# Patient Record
Sex: FEMALE | Race: WHITE | NOT HISPANIC OR LATINO | Employment: FULL TIME | URBAN - NONMETROPOLITAN AREA
[De-identification: names, ages, dates, MRNs, and addresses within clinical notes are randomized per-mention and may not be internally consistent; named-entity substitution may affect disease eponyms.]

---

## 2020-01-19 NOTE — PROGRESS NOTES
Zoltan Metz MD     Chief Complaint   Patient presents with   • Sore Throat     tonsilitis 3 times last 5 months        HPI   Bhupinder Phan is a  19 y.o.  female who complains of frequent tonsillitis. The symptoms are localized to the throat. The patient has had moderate symptoms. The symptoms have been recurrent in nature, occurring 3-4 times for the last 3 years There have been no identified factors that aggravate the symptoms. The patient has been treated with antibiotics in the past with improved symptoms but a quick return after completion of the medication. She has had problems with antibiotics recently    Review of Systems   Reviewed per patient intake note.     Past History:  Past Medical History:   Diagnosis Date   • Allergic rhinitis    • Sinusitis    • Tonsillitis      Past Surgical History:   Procedure Laterality Date   • WISDOM TOOTH EXTRACTION       Family History   Problem Relation Age of Onset   • Hypertension Mother    • Cancer Maternal Grandmother         breast   • Cancer Paternal Grandfather      Social History     Tobacco Use   • Smoking status: Never Smoker   • Smokeless tobacco: Never Used   Substance Use Topics   • Alcohol use: Not Currently     Frequency: Never   • Drug use: Never     Outpatient Medications Marked as Taking for the 1/20/20 encounter (Office Visit) with Zoltan Metz MD   Medication Sig Dispense Refill   • ALTAVERA 0.15-30 MG-MCG per tablet Take  by mouth Daily.     • methylphenidate (CONCERTA) 27 MG CR tablet        Allergies:  Nuts        Vital Signs:  Temp:  [98.3 °F (36.8 °C)] 98.3 °F (36.8 °C)    Physical Exam   CONSTITUTIONAL: well nourished, well-developed, alert, oriented, in no acute distress   COMMUNICATION AND VOICE: able to communicate normally for age, normal voice/cry quality  HEAD: normocephalic, no lesions, atraumatic, no tenderness, no masses   FACE: appearance normal, no lesions, no tenderness, no deformities, facial motion  symmetric  SALIVARY GLANDS: parotid glands with no tenderness, no swelling, no masses, submandibular glands with normal size, nontender  EYES: ocular motility normal, eyelids normal, orbits normal, no proptosis, conjunctiva normal , pupils equal, round   EARS:  Hearing: response to conversational voice normal bilaterally   External Ears: auricles without lesions  EXTERNAL EAR CANALS: normal ear canals without stenosis or significant cerumen  TYMPANIC MEMBRANES: tympanic membrane appearance normal, no lesions, no perforation, normal mobility, no fluid  NOSE:  External Nose: structure normal, no tenderness on palpation, no nasal discharge, no lesions, no evidence of trauma, nostrils patent   Intranasal Exam: nasal mucosa normal, vestibule within normal limits, inferior turbinate normal, nasal septum midline   Nasopharynx: mirror exam deferred  ORAL:  Lips: upper and lower lips without lesion   Teeth: dentition within normal limits for age   Gums: gingivae healthy   Oral Mucosa: oral mucosa normal, no mucosal lesions   Floor of Mouth: Warthin’s duct patent, mucosa normal  Tongue: lingual mucosa normal without lesions, normal tongue mobility   Palate: soft and hard palates with normal mucosa and structure  Oropharynx: oropharynx mild erythema present, tonsils 2+ size  HYPOPHARYNX: mirror exam deferred  LARYNX: mirror exam deferred   NECK: neck appearance normal, no masses or tenderness  THYROID: no overt thyromegaly, no tenderness, nodules or mass present on palpation, position midline   LYMPHATIC: no lymphadenopathy  CHEST/RESPIRATORY: respiratory effort normal, normal chest excursion   CARDIOVASCULAR: extremities without cyanosis or edema   NEUROLOGIC/PSYCHIATRIC: oriented appropriately, mood normal, affect appropriate for age, CN II-XII intact grossly         Assessment:   1. Tonsillitis and adenoiditis, chronic            Plan:  Medical and surgical options were discussed including observation versus surgical  management. Risks, benefits and alternatives were discussed and questions were answered.  After considering the options, it was decided that tonsillectomy and adenoidectomy was the best option.     INFORMED CONSENT DISCUSSION:  TONSILLECTOMY AND ADENOIDECTOMY: A tonsillectomy and adenoidectomy were recommended. The risks and benefits were explained including but not limited to early and late bleeding, infection, risks of the general anesthesia, dysphagia and poor PO intake, and voice change/VPI.  Alternatives were discussed. Understanding of the risks was demonstrated. Questions were asked appropriately answered.      PREOPERATIVE WORKUP:   per anesthesia         FOLLOW UP:  follow up postoperatively        Zoltan Metz MD  01/20/20  12:34 PM

## 2020-01-20 ENCOUNTER — OFFICE VISIT (OUTPATIENT)
Dept: OTOLARYNGOLOGY | Facility: CLINIC | Age: 20
End: 2020-01-20

## 2020-01-20 VITALS — BODY MASS INDEX: 24.83 KG/M2 | HEIGHT: 68 IN | WEIGHT: 163.8 LBS | TEMPERATURE: 98.3 F

## 2020-01-20 DIAGNOSIS — J35.03 TONSILLITIS AND ADENOIDITIS, CHRONIC: Primary | ICD-10-CM

## 2020-01-20 PROCEDURE — 99203 OFFICE O/P NEW LOW 30 MIN: CPT | Performed by: OTOLARYNGOLOGY

## 2020-01-20 RX ORDER — LEVONORGESTREL AND ETHINYL ESTRADIOL 0.15-0.03
KIT ORAL DAILY
COMMUNITY
Start: 2019-12-27

## 2020-01-20 RX ORDER — METHYLPHENIDATE HYDROCHLORIDE 27 MG/1
27 TABLET ORAL EVERY MORNING
COMMUNITY
Start: 2020-01-15

## 2020-01-20 NOTE — PROGRESS NOTES
Dilcia Boone MA   Patient Intake Note    Review of Systems  Review of Systems   Constitutional: Negative.    HENT: Positive for sore throat.    Eyes: Negative.    Respiratory: Negative.    Cardiovascular: Negative.    Gastrointestinal: Negative.    Musculoskeletal: Negative.    Skin: Negative.    Neurological: Negative.    Psychiatric/Behavioral: Positive for sleep disturbance.       Tobacco Use: Screening and Cessation Intervention  Social History    Tobacco Use      Smoking status: Not on file        Dilcia Boone MA  1/20/2020  12:11 PM

## 2020-01-22 PROBLEM — J35.03 TONSILLITIS AND ADENOIDITIS, CHRONIC: Status: ACTIVE | Noted: 2020-01-22

## 2020-01-24 ENCOUNTER — APPOINTMENT (OUTPATIENT)
Dept: PREADMISSION TESTING | Facility: HOSPITAL | Age: 20
End: 2020-01-24

## 2020-01-24 LAB
DEPRECATED RDW RBC AUTO: 47.3 FL (ref 37–54)
ERYTHROCYTE [DISTWIDTH] IN BLOOD BY AUTOMATED COUNT: 14.8 % (ref 12.3–15.4)
HCT VFR BLD AUTO: 41.4 % (ref 34–46.6)
HGB BLD-MCNC: 13.9 G/DL (ref 12–15.9)
MCH RBC QN AUTO: 29.3 PG (ref 26.6–33)
MCHC RBC AUTO-ENTMCNC: 33.6 G/DL (ref 31.5–35.7)
MCV RBC AUTO: 87.2 FL (ref 79–97)
PLATELET # BLD AUTO: 267 10*3/MM3 (ref 140–450)
PMV BLD AUTO: 10.6 FL (ref 6–12)
RBC # BLD AUTO: 4.75 10*6/MM3 (ref 3.77–5.28)
WBC NRBC COR # BLD: 7.81 10*3/MM3 (ref 3.4–10.8)

## 2020-01-24 PROCEDURE — 85027 COMPLETE CBC AUTOMATED: CPT | Performed by: OTOLARYNGOLOGY

## 2020-01-24 PROCEDURE — 36415 COLL VENOUS BLD VENIPUNCTURE: CPT

## 2020-01-24 NOTE — DISCHARGE INSTRUCTIONS
TONSILLECTOMY / ADENOIDECTOMY   [facility]: [phone]  T&A is an outpatient surgical procedure lasting between 30 and 45 minutes and performed under general anesthesia. Normally, the young patient will remain at the hospital or clinic for several hours after surgery for observation. Children with severe obstructive sleep apnea and very young children are usually admitted overnight to the hospital for close monitoring of respiratory status. An overnight stay may also be required if there are complications such as excessive bleeding, severe vomiting, or low oxygen saturation.    WHEN THE TONSILLECTOMY PATIENT COMES HOME  Most children take seven to ten days to recover from the surgery (adult patients typically take a little longer).  Some may recover more quickly; others can take up to two weeks.     No follow up office visit will be required if the patient has an uncomplicated post-operative recovery period.  Someone from your doctor's office will call around 3 weeks after the surgery to discuss the recovery.     The Following Guidelines Are Recommended:  Drinking: The most important requirement for recovery is for the patient to drink plenty of fluids. Starting immediately after surgery, children may have fluids such as water or apple juice.  Some patients experience nausea and vomiting after the surgery. This usually occurs within the first 24 hours and resolves on its own after the effects of anesthesia wear off. Contact your physician if there are signs of dehydration (urination less than 2-3 times a day, crying without tears, or tongue/mucous membranes dry).    MINIMUM Fluid Intake for the First 24 Hour Period is calculated by weight:   Weight of Patient Minimum Fluid Intake   20-30 Pounds 34 Ounces   31-40 Pounds 42 Ounces   41-50 Pounds 50 Ounces   51-60 Pounds 58 Ounces   Over 60 Pounds 68 Ounces     Eating: A soft diet at cool temperatures is recommended during the recovery period. Tonsillectomy  "patients may be reluctant to eat because of throat pain; consequently, some weight loss may occur, which is gained back after a normal diet is resumed.   Have food available but there is no need to \"force\" a patient to eat. As long as the patient is drinking well, eating is not mandatory but should be encouraged.     Fever: A very common cause of post-op fever with T&A is dehydration, continue to encourage fluid intake with ice chips, ice water, popsicles, etc.   A low-grade fever may be observed the night of the surgery and for a day or two afterward.  Treat any fever with ibuprofen. If fever does not respond to Tylenol / ibuprofen, give tepid sponge bath to break fever.   If fever of greater than 102 continues, call your doctor as this may not be caused by the surgery.    Pain: Patients undergoing a tonsillectomy/adenoidectomy will have mild to severe pain in the throat after surgery.   Ear pain is very common and does not indicate a problem with the ears but is a \"referred\" pain that will resolve in a few days.  You may try a warm compress for ear pain by folding face/hand towel and wetting with warm water or microwaving, taking care that towel is not so hot as to burn the skin, then covering entire ear and leaving for several minutes and repeat as desired.   Some patients may have referred pain in the jaw and neck.     When tonsil beds dry out, usually at night from mouth breathing, the pain is usually worse, but this is common. Have patient take a drink when they are ready to lay down for sleep and take a drink immediately upon waking if complaints of pain.  Cool mist vaporizer at night in the bedroom will not eliminate this problem but it can help.    Pain Control: Your physician may prescribe hydrocodone elixir as pain medication. (By law, no prescription for narcotics can be called in to a pharmacy.  You will be given a written prescription.)  The pain medication will be in a liquid form. Pain medication " "should be given as prescribed.  You may supplement prescription pain medication with ibuprofen.  Do not give additional Tylenol because the prescribed pain medication has Tylenol in it also and too much Tylenol can be damaging to the liver.  Using an ice pack to throat and drinking COLD liquids will also help reduce discomfort.  Sometimes narcotics can cause itching.  This is a side effect not an allergy. Take Benadryl for itching and continue to use the hydrocodone. Call office or seek treatment in ER if symptoms involved swelling of throat or respiratory compromise.  Bleeding:   With the exception of small specks of blood from the nose or in the saliva, bright red blood should not be seen. If bleeding is suspected have patient gargle ice water and take note of color when patient spits it out.   If there is red color in the water being spit out, continue gargle/spit with ice water until water being spit out is clear.   If patient is swallowing blood they will vomit as the stomach will not tolerate blood.  Also, if blood is in the stomach, it will look like dark spicules often described as looking like \"coffee grounds\". If bleeding does not stop in 20 minutes take patient to Emergency Room.  Most of the local Emergency Medical facilities do not have ENT providers on call so if treatment for post-operative bleeding is needed, it may be best to bring the patient directly to [facility] Emergency Room.  Patients living a greater distance from [place] should not wait 20 minutes before leaving to seek treatment if profuse bleeding is occurring.    Scabs: A scab will form where the tonsils and adenoids were removed. These scabs are thick, white, and cause bad breath. This is normal.  When the scabs come off, usually day 5-10, there is a normal and expected increase in discomfort. This should be treated with prescribed medication, supplemented with ibuprofen, and increased fluid intake. A white coating or " "patchiness in the mouth is common and may resemble thrush but it is NOT thrush. This condition is not harmful and will resolve in time.  Patient may use a mild, tepid, saltwater rinse of 1 tsp salt in 8oz tepid water to swish and spit 2 to 3 times per day.  It is common for the uvula to become swollen due to the equipment used in the operation and it is rarely problematic. Ice chips and cold liquids can help the swelling and it should resolve itself in a few days. Keep patient’s head elevated.  If the uvula restricts or hinders swallowing or breathing, call this office or take patient to Emergency Room.     Nausea:  Nausea and/or vomiting 24-48 hours post-op is often caused by general anesthesia and should resolve as the anesthetic agents are metabolized and eliminated from the body.  If you suspect that the prescribed pain medication is causing stomach upset, pain medication can be given in divided and/or diluted doses over 20-30 minutes if that is easier for patient to tolerate. In fact, it may be better to always give the pain medication in divided doses. If abdominal pain is due to antibiotic therapy, eat 2-3 servings of live culture yogurt per day for 2-3 days. Increase fluid intake if the patient develops constipation.  Also any Over-the-Counter laxative or stool softener may be used.    Breathing: The parent may notice snoring and/or mouth breathing due to swelling in the throat. Breathing should return to normal when swelling subsides, 10-14 days after surgery.  When adenoids are removed the resulting inflammation can mimic a \"bad cold\" with nasal drainage and congestion which will resolve along with normal healing process.    Activity: Activity should be limited for 14 days following surgery.  No strenuous physical activity or contact sports will be allowed for 2 weeks.  Children may return to school before the 2 week period is up but with these restrictions.  Travel away from the area your doctor covers is " not recommended for two weeks following surgery.    Diet Following Tonsillectomy, Child  A tonsillectomy is a surgery to remove the tonsils. After a tonsillectomy, your child should eat foods that are easy to swallow and gentle on the throat. This makes recovery easier.   Follow the diet guidelines (cool, soft foods) on this sheet for 1-2 weeks or until any pain from the surgery is completely gone.  SUGGESTED FOODS  Grains   Soft bread. Soggy waffles or German toast without crust and soaked in syrup. Pancakes. Oatmeal or other creamy cereal. Soggy cold cereal. Pasta, noodles.   Vegetables   Cooked vegetables. Mashed potatoes.  Fruits   Applesauce. Bananas. Canned fruit. Watermelon without seeds.  Meats and Other Protein Sources   Hot dogs. Hamburger. Tender, moist meat. Tuna. Scrambled or poached eggs.  Dairy   Milk. Smooth yogurt. Cottage cheese. Processed cheeses.   Beverages   Milk. Juices without seeds.   Sweets/Desserts   Custard. Pudding. Ice cream. Malts, shakes.   Other   Soup. Macaroni and cheese. Smooth peanut butter and jelly sandwiches without crust.   The items listed above is not be a complete list of recommended foods or beverages. ANYTHING COOL AND SOFT IS ALLOWED.  WHAT FOODS ARE NOT RECOMMENDED?  Grains   Toast. Crispy waffles. Crunchy, cold cereal. Crackers. Pretzels. Popcorn.   Vegetables   Raw vegetables.   Fruits   Citrus fruits. Most fresh fruits, including oranges, apples, and melon.   Meats and Other Protein Sources   Tough, dry meat. Nuts.   Beverages   Citrus juices (such as orange juice or lemonade). Soda with bubbles.   Sweets/Desserts   Cookies.   Other   Fried foods. Chips. Grilled cheese sandwiches.        This information is not intended to replace advice given to you by your health care provider. Make sure you discuss any questions you have with your health care provider.     Document Released: 12/18/2006 Document Revised: 01/08/2016 Document Reviewed: 11/03/2014  [employer]  Interactive Patient Education ©2016 [employer]    Post-Tonsillectomy Supply List:   ? Humidifier  ?  Thermometer  ? Dye-free ibuprofen  Soft foodsDAY OF SURGERY INSTRUCTIONS        YOUR SURGEON: ***Zoltan Metz    PROCEDURE: ***Bilateral Tonsillectomy and Adenoidectomy with Coblation    DATE OF SURGERY: ***January 30, 2020     ARRIVAL TIME: AS DIRECTED BY OFFICE    YOU MAY TAKE THE FOLLOWING MEDICATION(S) THE MORNING OF SURGERY WITH A SIP OF WATER: ***None     ALL OTHER HOME MEDICATIONS CHECK WITH YOUR DOCTOR              MANAGING PAIN AFTER SURGERY    We know you are probably wondering what your pain will be like after surgery.  Following surgery it is unrealistic to expect you will not have pain.   Pain is how our bodies let us know that something is wrong or cautions us to be careful.  That said, our goal is to make your pain tolerable.    Methods we may use to treat your pain include (oral or IV medications, PCAs, epidurals, nerve blocks, etc.)   While some procedures require IV pain medications for a short time after surgery, transitioning to pain medications by mouth allows for better management of pain.   Your nurse will encourage you to take oral pain medications whenever possible.  IV medications work almost immediately, but only last a short while.  Taking medications by mouth allows for a more constant level of medication in your blood stream for a longer period of time.      Once your pain is out of control it is harder to get back under control.  It is important you are aware when your next dose of pain medication is due.  If you are admitted, your nurse may write the time of your next dose on the white board in your room to help you remember.      We are interested in your pain and encourage you to inform us about aggravating factors during your visit.   Many times a simple repositioning every few hours can make a big difference.    If your physician says it is okay, do not let your pain prevent you  from getting out of bed. Be sure to call your nurse for assistance prior to getting up so you do not fall.      Before surgery, please decide your tolerable pain goal.  These faces help describe the pain ratings we use on a 0-10 scale.   Be prepared to tell us your goal and whether or not you take pain or anxiety medications at home.      BEFORE YOU COME TO THE HOSPITAL  (Pre-op instructions)  Do not eat, drink, smoke or chew gum after midnight the night before surgery.  This also includes no mints.  Morning of surgery take only the medicines you have been instructed with a sip of water unless otherwise instructed  by your physician.  Do not shave, wear makeup or dark nail polish.  Remove all jewelry including rings.  Leave anything you consider valuable at home.  Leave your suitcase in the car until after your surgery.  Bring the following with you if applicable:  Picture ID and insurance, Medicare or Medicaid cards  Co-pay/deductible required by insurance (cash, check, credit card)  Copy of advance directive, living will or power-of- documents if not brought to [facility]  CPAP or BIPAP mask and tubing  Relaxation aids ( book, magazine), etc.  Hearing aids                                 ON THE DAY OF SURGERY  On the day of surgery check in at registration located at the main entrance of the hospital.   You will be registered and given a beeper with instructions where to wait in the main lobby.  When your beeper lights up and vibrates a member of the Outpatient Surgery staff will meet you at the double doors under the stair steps and escort you to your preoperative room.   You may have cloth compression devices placed on your legs. These help to prevent blood clots and reduce swelling in your legs.  An IV may be inserted into one of your veins.  In the operating room, you may be given one or more of the following:  A medicine to help you relax (sedative).  A medicine to numb the area (local anesthetic).  A  "medicine to make you fall asleep (general anesthetic).  A medicine that is injected into an area of your body to numb everything below the injection site (regional anesthetic).  Your surgical site will be marked or identified.  You may be given an antibiotic through your IV to help prevent infection.  Contact a health care provider if you:  Develop a fever of more than 100.4°F (38°C) or other feelings of illness during the 48 hours before your surgery.  Have symptoms that get worse.  Have questions or concerns about your surgery    General Anesthesia/Surgery, Adult  General anesthesia is the use of medicines to make a person \"go to sleep\" (unconscious) for a medical procedure. General anesthesia must be used for certain procedures, and is often recommended for procedures that:  Last a long time.  Require you to be still or in an unusual position.  Are major and can cause blood loss.  The medicines used for general anesthesia are called general anesthetics. As well as making you unconscious for a certain amount of time, these medicines:  Prevent pain.  Control your blood pressure.  Relax your muscles.  Tell a health care provider about:  Any allergies you have.  All medicines you are taking, including vitamins, herbs, eye drops, creams, and over-the-counter medicines.  Any problems you or family members have had with anesthetic medicines.  Types of anesthetics you have had in the past.  Any blood disorders you have.  Any surgeries you have had.  Any medical conditions you have.  Any recent upper respiratory, chest, or ear infections.  Any history of:  Heart or lung conditions, such as heart failure, sleep apnea, asthma, or chronic obstructive pulmonary disease (COPD).   service.  Depression or anxiety.  Any tobacco or drug use, including marijuana or alcohol use.  Whether you are pregnant or may be pregnant.  What are the risks?  Generally, this is a safe procedure. However, problems may occur, " including:  Allergic reaction.  Lung and heart problems.  Inhaling food or liquid from the stomach into the lungs (aspiration).  Nerve injury.  Air in the bloodstream, which can lead to stroke.  Extreme agitation or confusion (delirium) when you wake up from the anesthetic.  Waking up during your procedure and being unable to move. This is rare.  These problems are more likely to develop if you are having a major surgery or if you have an advanced or serious medical condition. You can prevent some of these complications by answering all of your health care provider's questions thoroughly and by following all instructions before your procedure.  General anesthesia can cause side effects, including:  Nausea or vomiting.  A sore throat from the breathing tube.  Hoarseness.  Wheezing or coughing.  Shaking chills.  Tiredness.  Body aches.  Anxiety.  Sleepiness or drowsiness.  Confusion or agitation.  RISKS AND COMPLICATIONS OF SURGERY  Your health care provider will discuss possible risks and complications with you before surgery. Common risks and complications include:    Problems due to the use of anesthetics.  Blood loss and replacement (does not apply to minor surgical procedures).  Temporary increase in pain due to surgery.  Uncorrected pain or problems that the surgery was meant to correct.  Infection.  New damage.    What happens before the procedure?    Medicines  Ask your health care provider about:  Changing or stopping your regular medicines. This is especially important if you are taking diabetes medicines or blood thinners.  Taking medicines such as aspirin and ibuprofen. These medicines can thin your blood. Do not take these medicines unless your health care provider tells you to take them.  Taking over-the-counter medicines, vitamins, herbs, and supplements. Do not take these during the week before your procedure unless your health care provider approves them.  General instructions  Starting 3-6 weeks  before the procedure, do not use any products that contain nicotine or tobacco, such as cigarettes and e-cigarettes. If you need help quitting, ask your health care provider.  If you brush your teeth on the morning of the procedure, make sure to spit out all of the toothpaste.  Tell your health care provider if you become ill or develop a cold, cough, or fever.  If instructed by your health care provider, bring your sleep apnea device with you on the day of your surgery (if applicable).  Ask your health care provider if you will be going home the same day, the following day, or after a longer hospital stay.  Plan to have someone take you home from the hospital or clinic.  Plan to have a responsible adult care for you for at least 24 hours after you leave the hospital or clinic. This is important.  What happens during the procedure?  You will be given anesthetics through both of the following:  A mask placed over your nose and mouth.  An IV in one of your veins.  You may receive a medicine to help you relax (sedative).  After you are unconscious, a breathing tube may be inserted down your throat to help you breathe. This will be removed before you wake up.  An anesthesia specialist will stay with you throughout your procedure. He or she will:  Keep you comfortable and safe by continuing to give you medicines and adjusting the amount of medicine that you get.  Monitor your blood pressure, pulse, and oxygen levels to make sure that the anesthetics do not cause any problems.  The procedure may vary among health care providers and hospitals.  What happens after the procedure?  Your blood pressure, temperature, heart rate, breathing rate, and blood oxygen level will be monitored until the medicines you were given have worn off.  You will wake up in a recovery area. You may wake up slowly.  If you feel anxious or agitated, you may be given medicine to help you calm down.  If you will be going home the same day, your health  care provider may check to make sure you can walk, drink, and urinate.  Your health care provider will treat any pain or side effects you have before you go home.  Do not drive for 24 hours if you were given a sedative.  Summary  General anesthesia is used to keep you still and prevent pain during a procedure.  It is important to tell your healthcare provider about your medical history and any surgeries you have had, and previous experience with anesthesia.  Follow your healthcare provider’s instructions about when to stop eating, drinking, or taking certain medicines before your procedure.  Plan to have someone take you home from the hospital or clinic.  This information is not intended to replace advice given to you by your health care provider. Make sure you discuss any questions you have with your health care provider.  Document Released: 03/26/2009 Document Revised: 08/03/2018 Document Reviewed: 08/03/2018  [employer] Interactive Patient Education © 2019 [employer]      Fall Prevention in Hospitals, Adult  As a hospital patient, your condition and the treatments you receive can increase your risk for falls. Some additional risk factors for falls in a hospital include:  Being in an unfamiliar environment.  Being on bed rest.  Your surgery.  Taking certain medicines.  Your tubing requirements, such as intravenous (IV) therapy or catheters.  It is important that you learn how to decrease fall risks while at the hospital. Below are important tips that can help prevent falls.  SAFETY TIPS FOR PREVENTING FALLS  Talk about your risk of falling.  Ask your health care provider why you are at risk for falling. Is it your medicine, illness, tubing placement, or something else?  Make a plan with your health care provider to keep you safe from falls.  Ask your health care provider or pharmacist about side effects of your medicines. Some medicines can make you dizzy or affect your coordination.  Ask for help.  Ask for help  before getting out of bed. You may need to press your call button.  Ask for assistance in getting safely to the toilet.  Ask for a walker or cane to be put at your bedside. Ask that most of the side rails on your bed be placed up before your health care provider leaves the room.  Ask family or friends to sit with you.  Ask for things that are out of your reach, such as your glasses, hearing aids, telephone, bedside table, or call button.  Follow these tips to avoid falling:  Stay lying or seated, rather than standing, while waiting for help.  Wear rubber-soled slippers or shoes whenever you walk in the hospital.  Avoid quick, sudden movements.  Change positions slowly.  Sit on the side of your bed before standing.  Stand up slowly and wait before you start to walk.  Let your health care provider know if there is a spill on the floor.  Pay careful attention to the medical equipment, electrical cords, and tubes around you.  When you need help, use your call button by your bed or in the bathroom. Wait for one of your health care providers to help you.  If you feel dizzy or unsure of your footing, return to bed and wait for assistance.  Avoid being distracted by the TV, telephone, or another person in your room.  Do not lean or support yourself on rolling objects, such as IV poles or bedside tables.     This information is not intended to replace advice given to you by your health care provider. Make sure you discuss any questions you have with your health care provider.     Document Released: 12/15/2001 Document Revised: 01/08/2016 Document Reviewed: 08/25/2013  [employer] Interactive Patient Education ©2016 [employer]            PATIENT/FAMILY/RESPONSIBLE PARTY VERBALIZES UNDERSTANDING OF ABOVE EDUCATION.  COPY OF PAIN SCALE GIVEN AND REVIEWED WITH VERBALIZED UNDERSTANDING.

## 2020-01-30 ENCOUNTER — ANESTHESIA EVENT (OUTPATIENT)
Dept: PERIOP | Facility: HOSPITAL | Age: 20
End: 2020-01-30

## 2020-01-30 ENCOUNTER — HOSPITAL ENCOUNTER (OUTPATIENT)
Facility: HOSPITAL | Age: 20
Setting detail: HOSPITAL OUTPATIENT SURGERY
Discharge: HOME | End: 2020-01-30
Attending: OTOLARYNGOLOGY | Admitting: OTOLARYNGOLOGY

## 2020-01-30 ENCOUNTER — ANESTHESIA (OUTPATIENT)
Dept: PERIOP | Facility: HOSPITAL | Age: 20
End: 2020-01-30

## 2020-01-30 VITALS
DIASTOLIC BLOOD PRESSURE: 78 MMHG | SYSTOLIC BLOOD PRESSURE: 114 MMHG | HEART RATE: 98 BPM | TEMPERATURE: 98.6 F | RESPIRATION RATE: 14 BRPM | OXYGEN SATURATION: 100 %

## 2020-01-30 DIAGNOSIS — Z90.89 S/P TONSILLECTOMY AND ADENOIDECTOMY: Primary | ICD-10-CM

## 2020-01-30 DIAGNOSIS — J35.03 TONSILLITIS AND ADENOIDITIS, CHRONIC: ICD-10-CM

## 2020-01-30 LAB — B-HCG UR QL: NEGATIVE

## 2020-01-30 PROCEDURE — 25010000002 MIDAZOLAM PER 1 MG: Performed by: ANESTHESIOLOGY

## 2020-01-30 PROCEDURE — 42821 REMOVE TONSILS AND ADENOIDS: CPT | Performed by: OTOLARYNGOLOGY

## 2020-01-30 PROCEDURE — 25010000002 FENTANYL CITRATE (PF) 250 MCG/5ML SOLUTION: Performed by: NURSE ANESTHETIST, CERTIFIED REGISTERED

## 2020-01-30 PROCEDURE — 25010000002 DEXAMETHASONE PER 1 MG: Performed by: NURSE ANESTHETIST, CERTIFIED REGISTERED

## 2020-01-30 PROCEDURE — 25010000002 DEXAMETHASONE PER 1 MG: Performed by: ANESTHESIOLOGY

## 2020-01-30 PROCEDURE — 25010000002 PROPOFOL 10 MG/ML EMULSION: Performed by: NURSE ANESTHETIST, CERTIFIED REGISTERED

## 2020-01-30 PROCEDURE — 25010000002 SUCCINYLCHOLINE PER 20 MG: Performed by: NURSE ANESTHETIST, CERTIFIED REGISTERED

## 2020-01-30 PROCEDURE — 81025 URINE PREGNANCY TEST: CPT | Performed by: OTOLARYNGOLOGY

## 2020-01-30 PROCEDURE — 88304 TISSUE EXAM BY PATHOLOGIST: CPT | Performed by: OTOLARYNGOLOGY

## 2020-01-30 PROCEDURE — 25010000002 ONDANSETRON PER 1 MG: Performed by: NURSE ANESTHETIST, CERTIFIED REGISTERED

## 2020-01-30 RX ORDER — LIDOCAINE HYDROCHLORIDE 10 MG/ML
0.5 INJECTION, SOLUTION EPIDURAL; INFILTRATION; INTRACAUDAL; PERINEURAL ONCE AS NEEDED
Status: DISCONTINUED | OUTPATIENT
Start: 2020-01-30 | End: 2020-01-30 | Stop reason: HOSPADM

## 2020-01-30 RX ORDER — SODIUM CHLORIDE 9 MG/ML
INJECTION, SOLUTION INTRAVENOUS CONTINUOUS PRN
Status: COMPLETED | OUTPATIENT
Start: 2020-01-30 | End: 2020-01-30

## 2020-01-30 RX ORDER — DEXTROSE, SODIUM CHLORIDE, AND POTASSIUM CHLORIDE 5; .45; .15 G/100ML; G/100ML; G/100ML
125 INJECTION INTRAVENOUS CONTINUOUS
Status: DISCONTINUED | OUTPATIENT
Start: 2020-01-30 | End: 2020-01-30 | Stop reason: HOSPADM

## 2020-01-30 RX ORDER — MIDAZOLAM HYDROCHLORIDE 1 MG/ML
1 INJECTION INTRAMUSCULAR; INTRAVENOUS
Status: DISCONTINUED | OUTPATIENT
Start: 2020-01-30 | End: 2020-01-30 | Stop reason: HOSPADM

## 2020-01-30 RX ORDER — NALOXONE HCL 0.4 MG/ML
0.4 VIAL (ML) INJECTION AS NEEDED
Status: DISCONTINUED | OUTPATIENT
Start: 2020-01-30 | End: 2020-01-30 | Stop reason: HOSPADM

## 2020-01-30 RX ORDER — IPRATROPIUM BROMIDE AND ALBUTEROL SULFATE 2.5; .5 MG/3ML; MG/3ML
3 SOLUTION RESPIRATORY (INHALATION) ONCE AS NEEDED
Status: DISCONTINUED | OUTPATIENT
Start: 2020-01-30 | End: 2020-01-30 | Stop reason: HOSPADM

## 2020-01-30 RX ORDER — MIDAZOLAM HYDROCHLORIDE 1 MG/ML
2 INJECTION INTRAMUSCULAR; INTRAVENOUS
Status: DISCONTINUED | OUTPATIENT
Start: 2020-01-30 | End: 2020-01-30 | Stop reason: HOSPADM

## 2020-01-30 RX ORDER — ACETAMINOPHEN 500 MG
1000 TABLET ORAL ONCE
Status: COMPLETED | OUTPATIENT
Start: 2020-01-30 | End: 2020-01-30

## 2020-01-30 RX ORDER — SODIUM CHLORIDE, SODIUM LACTATE, POTASSIUM CHLORIDE, CALCIUM CHLORIDE 600; 310; 30; 20 MG/100ML; MG/100ML; MG/100ML; MG/100ML
1000 INJECTION, SOLUTION INTRAVENOUS CONTINUOUS
Status: DISCONTINUED | OUTPATIENT
Start: 2020-01-30 | End: 2020-01-30 | Stop reason: HOSPADM

## 2020-01-30 RX ORDER — ONDANSETRON 2 MG/ML
4 INJECTION INTRAMUSCULAR; INTRAVENOUS ONCE AS NEEDED
Status: DISCONTINUED | OUTPATIENT
Start: 2020-01-30 | End: 2020-01-30 | Stop reason: HOSPADM

## 2020-01-30 RX ORDER — LABETALOL HYDROCHLORIDE 5 MG/ML
5 INJECTION, SOLUTION INTRAVENOUS
Status: DISCONTINUED | OUTPATIENT
Start: 2020-01-30 | End: 2020-01-30 | Stop reason: HOSPADM

## 2020-01-30 RX ORDER — SODIUM CHLORIDE 0.9 % (FLUSH) 0.9 %
3 SYRINGE (ML) INJECTION EVERY 12 HOURS SCHEDULED
Status: DISCONTINUED | OUTPATIENT
Start: 2020-01-30 | End: 2020-01-30 | Stop reason: HOSPADM

## 2020-01-30 RX ORDER — SODIUM CHLORIDE 0.9 % (FLUSH) 0.9 %
3 SYRINGE (ML) INJECTION AS NEEDED
Status: DISCONTINUED | OUTPATIENT
Start: 2020-01-30 | End: 2020-01-30 | Stop reason: HOSPADM

## 2020-01-30 RX ORDER — SUCCINYLCHOLINE CHLORIDE 20 MG/ML
INJECTION INTRAMUSCULAR; INTRAVENOUS AS NEEDED
Status: DISCONTINUED | OUTPATIENT
Start: 2020-01-30 | End: 2020-01-30 | Stop reason: SURG

## 2020-01-30 RX ORDER — FENTANYL CITRATE 50 UG/ML
INJECTION, SOLUTION INTRAMUSCULAR; INTRAVENOUS AS NEEDED
Status: DISCONTINUED | OUTPATIENT
Start: 2020-01-30 | End: 2020-01-30 | Stop reason: SURG

## 2020-01-30 RX ORDER — METOCLOPRAMIDE HYDROCHLORIDE 5 MG/ML
5 INJECTION INTRAMUSCULAR; INTRAVENOUS
Status: DISCONTINUED | OUTPATIENT
Start: 2020-01-30 | End: 2020-01-30 | Stop reason: HOSPADM

## 2020-01-30 RX ORDER — DEXAMETHASONE SODIUM PHOSPHATE 4 MG/ML
4 INJECTION, SOLUTION INTRA-ARTICULAR; INTRALESIONAL; INTRAMUSCULAR; INTRAVENOUS; SOFT TISSUE ONCE AS NEEDED
Status: COMPLETED | OUTPATIENT
Start: 2020-01-30 | End: 2020-01-30

## 2020-01-30 RX ORDER — ROCURONIUM BROMIDE 10 MG/ML
INJECTION, SOLUTION INTRAVENOUS AS NEEDED
Status: DISCONTINUED | OUTPATIENT
Start: 2020-01-30 | End: 2020-01-30 | Stop reason: SURG

## 2020-01-30 RX ORDER — LIDOCAINE HYDROCHLORIDE 20 MG/ML
INJECTION, SOLUTION INFILTRATION; PERINEURAL AS NEEDED
Status: DISCONTINUED | OUTPATIENT
Start: 2020-01-30 | End: 2020-01-30 | Stop reason: SURG

## 2020-01-30 RX ORDER — SODIUM CHLORIDE, SODIUM LACTATE, POTASSIUM CHLORIDE, CALCIUM CHLORIDE 600; 310; 30; 20 MG/100ML; MG/100ML; MG/100ML; MG/100ML
100 INJECTION, SOLUTION INTRAVENOUS CONTINUOUS
Status: DISCONTINUED | OUTPATIENT
Start: 2020-01-30 | End: 2020-01-30 | Stop reason: HOSPADM

## 2020-01-30 RX ORDER — FENTANYL CITRATE 50 UG/ML
25 INJECTION, SOLUTION INTRAMUSCULAR; INTRAVENOUS AS NEEDED
Status: DISCONTINUED | OUTPATIENT
Start: 2020-01-30 | End: 2020-01-30 | Stop reason: HOSPADM

## 2020-01-30 RX ORDER — ONDANSETRON 2 MG/ML
INJECTION INTRAMUSCULAR; INTRAVENOUS AS NEEDED
Status: DISCONTINUED | OUTPATIENT
Start: 2020-01-30 | End: 2020-01-30 | Stop reason: SURG

## 2020-01-30 RX ORDER — SODIUM CHLORIDE 0.9 % (FLUSH) 0.9 %
3-10 SYRINGE (ML) INJECTION AS NEEDED
Status: DISCONTINUED | OUTPATIENT
Start: 2020-01-30 | End: 2020-01-30 | Stop reason: HOSPADM

## 2020-01-30 RX ORDER — OXYCODONE HCL 5 MG/5 ML
5 SOLUTION, ORAL ORAL EVERY 4 HOURS PRN
Qty: 90 ML | Refills: 0 | Status: SHIPPED | OUTPATIENT
Start: 2020-01-30 | End: 2020-02-02

## 2020-01-30 RX ORDER — PROPOFOL 10 MG/ML
VIAL (ML) INTRAVENOUS AS NEEDED
Status: DISCONTINUED | OUTPATIENT
Start: 2020-01-30 | End: 2020-01-30 | Stop reason: SURG

## 2020-01-30 RX ORDER — DEXAMETHASONE SODIUM PHOSPHATE 4 MG/ML
INJECTION, SOLUTION INTRA-ARTICULAR; INTRALESIONAL; INTRAMUSCULAR; INTRAVENOUS; SOFT TISSUE AS NEEDED
Status: DISCONTINUED | OUTPATIENT
Start: 2020-01-30 | End: 2020-01-30 | Stop reason: SURG

## 2020-01-30 RX ORDER — OXYCODONE HCL 5 MG/5 ML
5 SOLUTION, ORAL ORAL EVERY 4 HOURS PRN
Status: DISCONTINUED | OUTPATIENT
Start: 2020-01-30 | End: 2020-01-30 | Stop reason: HOSPADM

## 2020-01-30 RX ADMIN — ROCURONIUM BROMIDE 5 MG: 10 INJECTION INTRAVENOUS at 10:21

## 2020-01-30 RX ADMIN — ACETAMINOPHEN 1000 MG: 500 TABLET, FILM COATED ORAL at 09:36

## 2020-01-30 RX ADMIN — ONDANSETRON HYDROCHLORIDE 4 MG: 2 SOLUTION INTRAMUSCULAR; INTRAVENOUS at 10:25

## 2020-01-30 RX ADMIN — FENTANYL CITRATE 50 MCG: 50 INJECTION, SOLUTION INTRAMUSCULAR; INTRAVENOUS at 10:42

## 2020-01-30 RX ADMIN — DEXAMETHASONE SODIUM PHOSPHATE 4 MG: 4 INJECTION, SOLUTION INTRAMUSCULAR; INTRAVENOUS at 09:36

## 2020-01-30 RX ADMIN — DEXAMETHASONE SODIUM PHOSPHATE 12 MG: 4 INJECTION, SOLUTION INTRAMUSCULAR; INTRAVENOUS at 10:25

## 2020-01-30 RX ADMIN — MIDAZOLAM 2 MG: 1 INJECTION INTRAMUSCULAR; INTRAVENOUS at 09:35

## 2020-01-30 RX ADMIN — MIDAZOLAM 2 MG: 1 INJECTION INTRAMUSCULAR; INTRAVENOUS at 09:57

## 2020-01-30 RX ADMIN — FENTANYL CITRATE 50 MCG: 50 INJECTION, SOLUTION INTRAMUSCULAR; INTRAVENOUS at 10:32

## 2020-01-30 RX ADMIN — FENTANYL CITRATE 50 MCG: 50 INJECTION, SOLUTION INTRAMUSCULAR; INTRAVENOUS at 10:26

## 2020-01-30 RX ADMIN — SUCCINYLCHOLINE CHLORIDE 80 MG: 20 INJECTION, SOLUTION INTRAMUSCULAR; INTRAVENOUS at 10:21

## 2020-01-30 RX ADMIN — PROPOFOL 150 MG: 10 INJECTION, EMULSION INTRAVENOUS at 10:21

## 2020-01-30 RX ADMIN — LIDOCAINE HYDROCHLORIDE 60 MG: 20 INJECTION, SOLUTION INFILTRATION; PERINEURAL at 10:21

## 2020-01-30 RX ADMIN — FENTANYL CITRATE 100 MCG: 50 INJECTION, SOLUTION INTRAMUSCULAR; INTRAVENOUS at 10:21

## 2020-01-30 NOTE — ANESTHESIA PREPROCEDURE EVALUATION
Anesthesia Evaluation     Patient summary reviewed   no history of anesthetic complications:  NPO Solid Status: > 8 hours  NPO Liquid Status: > 8 hours           Airway   Mallampati: I  TM distance: >3 FB  Neck ROM: full  No difficulty expected  Dental - normal exam     Pulmonary - negative pulmonary ROS   Cardiovascular - negative cardio ROS        Neuro/Psych  (+) psychiatric history ADHD,     GI/Hepatic/Renal/Endo - negative ROS     Musculoskeletal (-) negative ROS    Abdominal    Substance History      OB/GYN          Other - negative ROS       ROS/Med Hx Other: Chronic tonsillitis                  Anesthesia Plan    ASA 1     general     intravenous induction     Anesthetic plan, all risks, benefits, and alternatives have been provided, discussed and informed consent has been obtained with: patient.

## 2020-01-30 NOTE — ANESTHESIA POSTPROCEDURE EVALUATION
Patient: Bhupinder Phan    Procedure Summary     Date:  01/30/20 Room / Location:   [facility] 02 /  [facility]    Anesthesia Start:  1019 Anesthesia Stop:  1045    Procedure:  tonsillectomy and adenoidectomy with coblation (Bilateral Throat) Diagnosis:       Tonsillitis and adenoiditis, chronic      (Tonsillitis and adenoiditis, chronic [J35.03])    Surgeon:  Zoltan Metz MD Provider:  Zoltan Fried CRNA    Anesthesia Type:  general ASA Status:  1          Anesthesia Type: general    Vitals  Vitals Value Taken Time   /75 1/30/2020 11:15 AM   Temp 98.6 °F (37 °C) 1/30/2020 11:19 AM   Pulse 83 1/30/2020 11:19 AM   Resp 14 1/30/2020 11:19 AM   SpO2 100 % 1/30/2020 11:19 AM   Vitals shown include unvalidated device data.        Post Anesthesia Care and Evaluation    Patient location during evaluation: PACU  Patient participation: complete - patient participated  Level of consciousness: awake and alert  Pain management: adequate  Airway patency: patent  Anesthetic complications: No anesthetic complications    Cardiovascular status: acceptable  Respiratory status: acceptable  Hydration status: acceptable    Comments: Blood pressure 120/74, pulse 83, temperature 98.6 °F (37 °C), temperature source Temporal, resp. rate 14, last menstrual period 01/24/2020, SpO2 100 %, not currently breastfeeding.    Pt discharged from PACU based on bushra score >8

## 2020-01-30 NOTE — ANESTHESIA PROCEDURE NOTES
Airway  Date/Time: 1/30/2020 10:24 AM  Airway not difficult    General Information and Staff    Patient location during procedure: OR  CRNA: Zoltan Fried CRNA    Indications and Patient Condition  Indications for airway management: airway protection    Preoxygenated: yes  Mask difficulty assessment: 1 - vent by mask    Final Airway Details  Final airway type: endotracheal airway      Successful airway: ETT    Successful intubation technique: direct laryngoscopy  Endotracheal tube insertion site: oral  Blade: Erna  Blade size: 3.5.  ETT size (mm): 7.0  Cormack-Lehane Classification: grade I - full view of glottis  Placement verified by: chest auscultation and capnometry   Measured from: lips  ETT/EBT  to lips (cm): 21  Number of attempts at approach: 1  Assessment: lips, teeth, and gum same as pre-op and atraumatic intubation

## 2020-02-05 ENCOUNTER — TELEPHONE (OUTPATIENT)
Dept: OTOLARYNGOLOGY | Facility: CLINIC | Age: 20
End: 2020-02-05

## 2020-02-05 DIAGNOSIS — Z90.89 S/P TONSILLECTOMY AND ADENOIDECTOMY: Primary | ICD-10-CM

## 2020-02-05 NOTE — TELEPHONE ENCOUNTER
PT states she is not sleeping from the pain, educated on alternating medications, ice packs, soft cold food/liquids, humidifier.

## 2020-02-06 LAB
CYTO UR: NORMAL
LAB AP CASE REPORT: NORMAL
PATH REPORT.FINAL DX SPEC: NORMAL
PATH REPORT.GROSS SPEC: NORMAL

## 2020-02-24 ENCOUNTER — TELEPHONE (OUTPATIENT)
Dept: OTOLARYNGOLOGY | Facility: CLINIC | Age: 20
End: 2020-02-24

## 2020-02-24 NOTE — TELEPHONE ENCOUNTER
Date of call: 2/24/2020   Patient had tonsillectomy and adenoidectomy recently.  Patient has returned to school activities.  Current complaints: none    Patient/caregiver asked if they have had any abnormal throat pain (other than pain felt with yawning, chewing, coughing, etc), difficulty swallowing, nasal regurgitation (when swallowing food/liquids some of the material comes out of their nose), or voice changes and if any of these conditions have been persistent or failed to improve.    The patient has not had throat pain (other than yawning, chewing, coughing, etc.), difficulty swallowing, nasal regurgitation, and voice changes  She did not have significant postoperative symptoms.    Questions asked by patient/caregiver: none    Instructions: Continue with post-op care as instructed. Pain with yawning and/or chewing is normal and should resolve over next few weeks.  Changes in voice should be temoprary but if it continues through 8 weeks post oropharynx,  call this office. The patient/ caregiver was encouraged to call this office if any other questions or concerns arise or if all symptoms have not resolved in 8 weeks.    The patient is recovering without significant complication. No follow up appointment is scheduled.    Janay Amaya LPN  2/24/2020  10:10 AM

## 2020-02-24 NOTE — TELEPHONE ENCOUNTER
----- Message from Sharona Perry RN sent at 2/11/2020  2:13 PM CST -----  Regarding: T&A  Surgery

## (undated) DEVICE — EVAC 70 XTRA HP WAND: Brand: COBLATION

## (undated) DEVICE — GLV SURG BIOGEL M LTX PF 7 1/2

## (undated) DEVICE — PAD T&A PACK: Brand: MEDLINE INDUSTRIES, INC.

## (undated) DEVICE — PK TURNOVER RM ADV